# Patient Record
Sex: FEMALE | Race: WHITE | NOT HISPANIC OR LATINO | Employment: PART TIME | URBAN - METROPOLITAN AREA
[De-identification: names, ages, dates, MRNs, and addresses within clinical notes are randomized per-mention and may not be internally consistent; named-entity substitution may affect disease eponyms.]

---

## 2018-04-02 ENCOUNTER — OFFICE VISIT (OUTPATIENT)
Dept: URGENT CARE | Facility: CLINIC | Age: 33
End: 2018-04-02
Payer: COMMERCIAL

## 2018-04-02 VITALS
SYSTOLIC BLOOD PRESSURE: 148 MMHG | DIASTOLIC BLOOD PRESSURE: 74 MMHG | OXYGEN SATURATION: 98 % | HEART RATE: 64 BPM | TEMPERATURE: 97.9 F | RESPIRATION RATE: 18 BRPM

## 2018-04-02 DIAGNOSIS — B34.9 ACUTE VIRAL SYNDROME: Primary | ICD-10-CM

## 2018-04-02 DIAGNOSIS — J02.9 ACUTE PHARYNGITIS, UNSPECIFIED ETIOLOGY: ICD-10-CM

## 2018-04-02 LAB — S PYO AG THROAT QL: NEGATIVE

## 2018-04-02 PROCEDURE — 87430 STREP A AG IA: CPT | Performed by: NURSE PRACTITIONER

## 2018-04-02 PROCEDURE — S9088 SERVICES PROVIDED IN URGENT: HCPCS | Performed by: NURSE PRACTITIONER

## 2018-04-02 PROCEDURE — 99203 OFFICE O/P NEW LOW 30 MIN: CPT | Performed by: NURSE PRACTITIONER

## 2018-04-02 RX ORDER — PREDNISONE 10 MG/1
10 TABLET ORAL 2 TIMES DAILY WITH MEALS
Qty: 10 TABLET | Refills: 0 | Status: SHIPPED | OUTPATIENT
Start: 2018-04-02 | End: 2018-04-07

## 2018-04-02 NOTE — PATIENT INSTRUCTIONS
Discussed viral vs bacterial infection  RX medication as directed  Continue OTC cough/cold medications as directed  Call or return for problems/concerns  Work note given

## 2018-04-02 NOTE — PROGRESS NOTES
[email or web address] [email or web address]        NAME: Carlos Worley is a 27 y o  female  : 1985    MRN: [id number]  DATE: 2018  TIME: 9:09 AM    Assessment and Plan   Acute viral syndrome [B34 9]  1  Acute viral syndrome  predniSONE 10 mg tablet   2  Acute pharyngitis, unspecified etiology  POCT rapid strepA    predniSONE 10 mg tablet         Patient Instructions     Patient Instructions   Discussed viral vs bacterial infection  RX medication as directed  Continue OTC cough/cold medications as directed  Call or return for problems/concerns  Work note given      Follow up with PCP in 3-5 days  Proceed to  ER if symptoms worsen  Chief Complaint     Chief Complaint   Patient presents with    Sore Throat     Pt c/o a sore throat, fever and right ear pain since this morning  History of Present Illness       Was feeling fatigued yesterday, woke up with sore throat and right ear pain this morning  Fever was 101 0 this am, took Advil and is feeling slightly improved      Sore Throat    Associated symptoms include coughing and headaches  Pertinent negatives include no diarrhea or vomiting  Review of Systems   Review of Systems   Constitutional: Positive for activity change, fatigue and fever  HENT: Positive for rhinorrhea and sore throat  Eyes: Negative for pain, discharge and redness  Respiratory: Positive for cough  Negative for wheezing  Cardiovascular: Negative for chest pain  Gastrointestinal: Negative for constipation, diarrhea, nausea and vomiting  Musculoskeletal: Positive for myalgias  Skin: Negative for rash  Neurological: Positive for headaches  Negative for dizziness           Current Medications       Current Outpatient Prescriptions:     predniSONE 10 mg tablet, Take 1 tablet (10 mg total) by mouth 2 (two) times a day with meals for 5 days, Disp: 10 tablet, Rfl: 0    Current Allergies     Allergies as of 2018    (No Known Allergies)            The following portions of the patient's history were reviewed and updated as appropriate: allergies, current medications, past family history, past medical history, past social history, past surgical history and problem list      No past medical history on file  No past surgical history on file  No family history on file  Medications have been verified  Objective   /74   Pulse 64   Temp 97 9 °F (36 6 °C)   Resp 18   SpO2 98%        Physical Exam     Physical Exam   Constitutional: She is oriented to person, place, and time  She appears well-developed and well-nourished  She has a sickly appearance  No distress  HENT:   Head: Normocephalic and atraumatic  Right Ear: Tympanic membrane, external ear and ear canal normal    Left Ear: Tympanic membrane, external ear and ear canal normal    Nose: No epistaxis  Mouth/Throat: Uvula is midline, oropharynx is clear and moist and mucous membranes are normal    Eyes: Conjunctivae and EOM are normal  Right eye exhibits no discharge  Left eye exhibits no discharge  Cardiovascular: Regular rhythm and normal heart sounds  Tachycardia present  Exam reveals no gallop and no friction rub  No murmur heard  Pulmonary/Chest: Effort normal and breath sounds normal  No respiratory distress  She has no wheezes  She has no rales  Abdominal: She exhibits no distension  There is no tenderness  Lymphadenopathy:     She has no cervical adenopathy  Neurological: She is alert and oriented to person, place, and time  Skin: Skin is warm  She is not diaphoretic  Psychiatric: She has a normal mood and affect  Her behavior is normal  Judgment and thought content normal    Nursing note and vitals reviewed

## 2021-11-18 ENCOUNTER — OFFICE VISIT (OUTPATIENT)
Dept: FAMILY MEDICINE CLINIC | Facility: CLINIC | Age: 36
End: 2021-11-18
Payer: COMMERCIAL

## 2021-11-18 VITALS
SYSTOLIC BLOOD PRESSURE: 112 MMHG | HEART RATE: 76 BPM | WEIGHT: 152 LBS | DIASTOLIC BLOOD PRESSURE: 80 MMHG | BODY MASS INDEX: 25.95 KG/M2 | OXYGEN SATURATION: 98 % | TEMPERATURE: 99.8 F | HEIGHT: 64 IN

## 2021-11-18 DIAGNOSIS — Z13.220 LIPID SCREENING: ICD-10-CM

## 2021-11-18 DIAGNOSIS — Z71.3 ENCOUNTER FOR WEIGHT LOSS COUNSELING: ICD-10-CM

## 2021-11-18 DIAGNOSIS — Z13.31 NEGATIVE DEPRESSION SCREENING: ICD-10-CM

## 2021-11-18 DIAGNOSIS — Z13.0 SCREENING FOR DEFICIENCY ANEMIA: ICD-10-CM

## 2021-11-18 DIAGNOSIS — Z12.4 SCREENING FOR CERVICAL CANCER: ICD-10-CM

## 2021-11-18 DIAGNOSIS — E55.9 VITAMIN D INSUFFICIENCY: ICD-10-CM

## 2021-11-18 DIAGNOSIS — R63.5 WEIGHT GAIN FINDING: ICD-10-CM

## 2021-11-18 DIAGNOSIS — Z13.29 THYROID DISORDER SCREEN: ICD-10-CM

## 2021-11-18 DIAGNOSIS — Z76.89 ENCOUNTER TO ESTABLISH CARE: Primary | ICD-10-CM

## 2021-11-18 DIAGNOSIS — Z13.1 DIABETES MELLITUS SCREENING: ICD-10-CM

## 2021-11-18 PROCEDURE — 3725F SCREEN DEPRESSION PERFORMED: CPT | Performed by: PHYSICIAN ASSISTANT

## 2021-11-18 PROCEDURE — 1036F TOBACCO NON-USER: CPT | Performed by: PHYSICIAN ASSISTANT

## 2021-11-18 PROCEDURE — 3008F BODY MASS INDEX DOCD: CPT | Performed by: PHYSICIAN ASSISTANT

## 2021-11-18 PROCEDURE — 99203 OFFICE O/P NEW LOW 30 MIN: CPT | Performed by: PHYSICIAN ASSISTANT

## 2021-11-18 RX ORDER — VALACYCLOVIR HYDROCHLORIDE 1 G/1
TABLET, FILM COATED ORAL AS NEEDED
COMMUNITY
Start: 2021-10-05 | End: 2021-12-17 | Stop reason: SDUPTHER

## 2021-11-18 RX ORDER — PHENTERMINE HYDROCHLORIDE 37.5 MG/1
TABLET ORAL
Qty: 30 TABLET | Refills: 0 | Status: SHIPPED | OUTPATIENT
Start: 2021-11-18 | End: 2021-12-17 | Stop reason: SDUPTHER

## 2021-11-19 ENCOUNTER — APPOINTMENT (OUTPATIENT)
Dept: LAB | Facility: CLINIC | Age: 36
End: 2021-11-19
Payer: COMMERCIAL

## 2021-11-19 DIAGNOSIS — Z13.0 SCREENING FOR DEFICIENCY ANEMIA: ICD-10-CM

## 2021-11-19 DIAGNOSIS — E55.9 VITAMIN D INSUFFICIENCY: ICD-10-CM

## 2021-11-19 DIAGNOSIS — Z13.220 LIPID SCREENING: ICD-10-CM

## 2021-11-19 DIAGNOSIS — Z13.1 DIABETES MELLITUS SCREENING: ICD-10-CM

## 2021-11-19 DIAGNOSIS — Z13.29 THYROID DISORDER SCREEN: ICD-10-CM

## 2021-11-19 DIAGNOSIS — R63.5 WEIGHT GAIN FINDING: ICD-10-CM

## 2021-11-19 LAB
25(OH)D3 SERPL-MCNC: 19 NG/ML (ref 30–100)
ALBUMIN SERPL BCP-MCNC: 4 G/DL (ref 3.5–5)
ALP SERPL-CCNC: 65 U/L (ref 46–116)
ALT SERPL W P-5'-P-CCNC: 25 U/L (ref 12–78)
ANION GAP SERPL CALCULATED.3IONS-SCNC: 7 MMOL/L (ref 4–13)
AST SERPL W P-5'-P-CCNC: 13 U/L (ref 5–45)
BASOPHILS # BLD AUTO: 0.03 THOUSANDS/ΜL (ref 0–0.1)
BASOPHILS NFR BLD AUTO: 1 % (ref 0–1)
BILIRUB SERPL-MCNC: 0.76 MG/DL (ref 0.2–1)
BUN SERPL-MCNC: 10 MG/DL (ref 5–25)
CALCIUM SERPL-MCNC: 9.2 MG/DL (ref 8.3–10.1)
CHLORIDE SERPL-SCNC: 104 MMOL/L (ref 100–108)
CHOLEST SERPL-MCNC: 138 MG/DL
CO2 SERPL-SCNC: 26 MMOL/L (ref 21–32)
CREAT SERPL-MCNC: 0.59 MG/DL (ref 0.6–1.3)
EOSINOPHIL # BLD AUTO: 0.2 THOUSAND/ΜL (ref 0–0.61)
EOSINOPHIL NFR BLD AUTO: 3 % (ref 0–6)
ERYTHROCYTE [DISTWIDTH] IN BLOOD BY AUTOMATED COUNT: 11.9 % (ref 11.6–15.1)
GFR SERPL CREATININE-BSD FRML MDRD: 117 ML/MIN/1.73SQ M
GLUCOSE P FAST SERPL-MCNC: 87 MG/DL (ref 65–99)
HCT VFR BLD AUTO: 39.5 % (ref 34.8–46.1)
HDLC SERPL-MCNC: 51 MG/DL
HGB BLD-MCNC: 13.4 G/DL (ref 11.5–15.4)
IMM GRANULOCYTES # BLD AUTO: 0.03 THOUSAND/UL (ref 0–0.2)
IMM GRANULOCYTES NFR BLD AUTO: 1 % (ref 0–2)
LDLC SERPL CALC-MCNC: 79 MG/DL (ref 0–100)
LYMPHOCYTES # BLD AUTO: 2.26 THOUSANDS/ΜL (ref 0.6–4.47)
LYMPHOCYTES NFR BLD AUTO: 39 % (ref 14–44)
MCH RBC QN AUTO: 29.6 PG (ref 26.8–34.3)
MCHC RBC AUTO-ENTMCNC: 33.9 G/DL (ref 31.4–37.4)
MCV RBC AUTO: 87 FL (ref 82–98)
MONOCYTES # BLD AUTO: 0.52 THOUSAND/ΜL (ref 0.17–1.22)
MONOCYTES NFR BLD AUTO: 9 % (ref 4–12)
NEUTROPHILS # BLD AUTO: 2.79 THOUSANDS/ΜL (ref 1.85–7.62)
NEUTS SEG NFR BLD AUTO: 47 % (ref 43–75)
NONHDLC SERPL-MCNC: 87 MG/DL
NRBC BLD AUTO-RTO: 0 /100 WBCS
PLATELET # BLD AUTO: 225 THOUSANDS/UL (ref 149–390)
PMV BLD AUTO: 9.8 FL (ref 8.9–12.7)
POTASSIUM SERPL-SCNC: 4.1 MMOL/L (ref 3.5–5.3)
PROT SERPL-MCNC: 7.7 G/DL (ref 6.4–8.2)
RBC # BLD AUTO: 4.53 MILLION/UL (ref 3.81–5.12)
SODIUM SERPL-SCNC: 137 MMOL/L (ref 136–145)
TRIGL SERPL-MCNC: 40 MG/DL
TSH SERPL DL<=0.05 MIU/L-ACNC: 3.34 UIU/ML (ref 0.36–3.74)
WBC # BLD AUTO: 5.83 THOUSAND/UL (ref 4.31–10.16)

## 2021-11-19 PROCEDURE — 85025 COMPLETE CBC W/AUTO DIFF WBC: CPT

## 2021-11-19 PROCEDURE — 80053 COMPREHEN METABOLIC PANEL: CPT

## 2021-11-19 PROCEDURE — 80061 LIPID PANEL: CPT

## 2021-11-19 PROCEDURE — 36415 COLL VENOUS BLD VENIPUNCTURE: CPT

## 2021-11-19 PROCEDURE — 84443 ASSAY THYROID STIM HORMONE: CPT

## 2021-11-19 PROCEDURE — 82306 VITAMIN D 25 HYDROXY: CPT

## 2021-11-22 DIAGNOSIS — E55.9 VITAMIN D DEFICIENCY: Primary | ICD-10-CM

## 2021-11-22 RX ORDER — ERGOCALCIFEROL 1.25 MG/1
50000 CAPSULE ORAL WEEKLY
Qty: 12 CAPSULE | Refills: 0 | Status: SHIPPED | OUTPATIENT
Start: 2021-11-22 | End: 2022-03-18

## 2021-11-26 ENCOUNTER — TELEPHONE (OUTPATIENT)
Dept: FAMILY MEDICINE CLINIC | Facility: CLINIC | Age: 36
End: 2021-11-26

## 2021-12-17 ENCOUNTER — OFFICE VISIT (OUTPATIENT)
Dept: FAMILY MEDICINE CLINIC | Facility: CLINIC | Age: 36
End: 2021-12-17
Payer: COMMERCIAL

## 2021-12-17 VITALS
SYSTOLIC BLOOD PRESSURE: 122 MMHG | DIASTOLIC BLOOD PRESSURE: 80 MMHG | OXYGEN SATURATION: 99 % | BODY MASS INDEX: 24.99 KG/M2 | WEIGHT: 150 LBS | HEIGHT: 65 IN | HEART RATE: 86 BPM | TEMPERATURE: 98.1 F

## 2021-12-17 DIAGNOSIS — R41.840 ATTENTION DEFICIT: ICD-10-CM

## 2021-12-17 DIAGNOSIS — Z71.3 ENCOUNTER FOR WEIGHT LOSS COUNSELING: Primary | ICD-10-CM

## 2021-12-17 DIAGNOSIS — Z12.4 SCREENING FOR CERVICAL CANCER: ICD-10-CM

## 2021-12-17 DIAGNOSIS — Z86.19 H/O COLD SORES: ICD-10-CM

## 2021-12-17 DIAGNOSIS — E55.9 VITAMIN D DEFICIENCY: ICD-10-CM

## 2021-12-17 PROCEDURE — 99214 OFFICE O/P EST MOD 30 MIN: CPT | Performed by: PHYSICIAN ASSISTANT

## 2021-12-17 PROCEDURE — 1036F TOBACCO NON-USER: CPT | Performed by: PHYSICIAN ASSISTANT

## 2021-12-17 PROCEDURE — 3008F BODY MASS INDEX DOCD: CPT | Performed by: PHYSICIAN ASSISTANT

## 2021-12-17 RX ORDER — VALACYCLOVIR HYDROCHLORIDE 1 G/1
2000 TABLET, FILM COATED ORAL 2 TIMES DAILY
Qty: 12 TABLET | Refills: 1 | Status: SHIPPED | OUTPATIENT
Start: 2021-12-17 | End: 2022-03-18

## 2021-12-17 RX ORDER — PHENTERMINE HYDROCHLORIDE 37.5 MG/1
37.5 TABLET ORAL DAILY
Qty: 30 TABLET | Refills: 0 | Status: SHIPPED | OUTPATIENT
Start: 2021-12-17 | End: 2022-01-27 | Stop reason: SDUPTHER

## 2021-12-23 ENCOUNTER — TELEPHONE (OUTPATIENT)
Dept: FAMILY MEDICINE CLINIC | Facility: CLINIC | Age: 36
End: 2021-12-23

## 2021-12-27 DIAGNOSIS — Z13.1 DIABETES MELLITUS SCREENING: ICD-10-CM

## 2021-12-27 DIAGNOSIS — Z13.0 SCREENING FOR DEFICIENCY ANEMIA: ICD-10-CM

## 2021-12-27 DIAGNOSIS — Z13.21 ENCOUNTER FOR VITAMIN DEFICIENCY SCREENING: ICD-10-CM

## 2021-12-27 DIAGNOSIS — Z13.29 THYROID DISORDER SCREEN: Primary | ICD-10-CM

## 2022-01-27 DIAGNOSIS — Z71.3 ENCOUNTER FOR WEIGHT LOSS COUNSELING: ICD-10-CM

## 2022-01-27 RX ORDER — PHENTERMINE HYDROCHLORIDE 37.5 MG/1
37.5 TABLET ORAL DAILY
Qty: 30 TABLET | Refills: 0 | Status: SHIPPED | OUTPATIENT
Start: 2022-01-27 | End: 2022-03-18

## 2022-01-27 NOTE — TELEPHONE ENCOUNTER
Please let patient know that after this month, will taper to half dose and then stop (as explained at vist short term medication)

## 2022-03-18 ENCOUNTER — OFFICE VISIT (OUTPATIENT)
Dept: FAMILY MEDICINE CLINIC | Facility: CLINIC | Age: 37
End: 2022-03-18
Payer: COMMERCIAL

## 2022-03-18 VITALS
OXYGEN SATURATION: 99 % | DIASTOLIC BLOOD PRESSURE: 82 MMHG | HEIGHT: 65 IN | BODY MASS INDEX: 23.82 KG/M2 | TEMPERATURE: 98.4 F | HEART RATE: 78 BPM | WEIGHT: 143 LBS | SYSTOLIC BLOOD PRESSURE: 136 MMHG

## 2022-03-18 DIAGNOSIS — F90.0 ATTENTION DEFICIT HYPERACTIVITY DISORDER (ADHD), PREDOMINANTLY INATTENTIVE TYPE: Primary | ICD-10-CM

## 2022-03-18 PROCEDURE — 1036F TOBACCO NON-USER: CPT | Performed by: PHYSICIAN ASSISTANT

## 2022-03-18 PROCEDURE — 99213 OFFICE O/P EST LOW 20 MIN: CPT | Performed by: PHYSICIAN ASSISTANT

## 2022-03-18 PROCEDURE — 3008F BODY MASS INDEX DOCD: CPT | Performed by: PHYSICIAN ASSISTANT

## 2022-03-18 RX ORDER — DEXTROAMPHETAMINE SACCHARATE, AMPHETAMINE ASPARTATE, DEXTROAMPHETAMINE SULFATE AND AMPHETAMINE SULFATE 2.5; 2.5; 2.5; 2.5 MG/1; MG/1; MG/1; MG/1
5 TABLET ORAL
Qty: 60 TABLET | Refills: 0 | Status: SHIPPED | OUTPATIENT
Start: 2022-03-18 | End: 2022-05-19 | Stop reason: SDUPTHER

## 2022-03-18 NOTE — PROGRESS NOTES
Routine Follow-up    Angelo Cotton 43 y o  female   Date:  3/18/2022      Assessment and Plan:    Cathy Fuller was seen today for follow-up  Diagnoses and all orders for this visit:    Attention deficit hyperactivity disorder (ADHD), predominantly inattentive type  -     amphetamine-dextroamphetamine (ADDERALL, 10MG,) 10 mg tablet; Take 0 5 tablets (5 mg total) by mouth 2 (two) times a day Max Daily Amount: 10 mg  - patient was diagnosed with ADHD during elementary school, was treated with ritalin  - she is having difficulty with concentration and ability to complete tasks in job and at home   - she understands this is a controlled substance, at risk of abuse, misuse, dependence  - follow up in 3 months   - can adjust dose prn     BMI 23 0-23 9, adult  Comments:  patient had successful weight loss following 3 month course of phentermine, she understands this is a short term course; continue healthy lifestyle           HPI:  Chief Complaint   Patient presents with    Follow-up     3 month check  No concerns today Declined flu shot  No refills needed today  HPI   Patient is a 42 yo female who presents for 3 month follow up  She has been able to lose 10 lbs  Her BMI is healthy  She has been on adipex x 3 months  She had cut the 37 5 mg tablet in half to take daily, only has small amount left  She was diagnosed with ADHD in elementary school  She was on ritalin during school  She noticed such an improvement in her ability to focus and complete tasks while being on the phentermine  She was having difficulty concentrating at work or completing tasks at home  She did have tdap within past 10 years, unsure of date  She will schedule appt with Dr Dahiana Dial for pap smear  ROS: Review of Systems   Constitutional: Negative  Unexpected weight change: intentional wt loss  Respiratory: Negative  Cardiovascular: Negative  Neurological: Negative  Psychiatric/Behavioral: Positive for decreased concentration  Negative for dysphoric mood  The patient is not nervous/anxious  History reviewed  No pertinent past medical history    Patient Active Problem List   Diagnosis    Vitamin D deficiency       Past Surgical History:   Procedure Laterality Date    AUGMENTATION BREAST Bilateral 2012     SECTION      MOUTH SURGERY         Social History     Socioeconomic History    Marital status: /Civil Union     Spouse name: None    Number of children: None    Years of education: None    Highest education level: None   Occupational History    None   Tobacco Use    Smoking status: Former Smoker    Smokeless tobacco: Never Used   Substance and Sexual Activity    Alcohol use: Yes     Comment: social    Drug use: Never    Sexual activity: None   Other Topics Concern    None   Social History Narrative    None     Social Determinants of Health     Financial Resource Strain: Not on file   Food Insecurity: Not on file   Transportation Needs: Not on file   Physical Activity: Not on file   Stress: Not on file   Social Connections: Not on file   Intimate Partner Violence: Not on file   Housing Stability: Not on file       Family History   Problem Relation Age of Onset    Diabetes Mother     Hypertension Father     Hyperlipidemia Father        No Known Allergies      Current Outpatient Medications:     Multiple Vitamin (MULTIVITAMIN ADULT PO), Take 1 tablet by mouth daily, Disp: , Rfl:     valACYclovir (VALTREX) 1,000 mg tablet, Take 2 tablets (2,000 mg total) by mouth 2 (two) times a day for 1 day (Patient taking differently: Take 2,000 mg by mouth 2 (two) times a day as needed  ), Disp: 12 tablet, Rfl: 1    amphetamine-dextroamphetamine (ADDERALL, 10MG,) 10 mg tablet, Take 0 5 tablets (5 mg total) by mouth 2 (two) times a day Max Daily Amount: 10 mg, Disp: 60 tablet, Rfl: 0      Physical Exam:  /82 (BP Location: Left arm, Patient Position: Sitting, Cuff Size: Standard)   Pulse 78   Temp 98 4 °F (36 9 °C) (Tympanic)   Ht 5' 5" (1 651 m)   Wt 64 9 kg (143 lb)   SpO2 99%   BMI 23 80 kg/m²     Physical Exam  Constitutional:       General: She is not in acute distress  Appearance: Normal appearance  She is normal weight  Cardiovascular:      Rate and Rhythm: Normal rate and regular rhythm  Heart sounds: No murmur heard  Pulmonary:      Effort: Pulmonary effort is normal  No respiratory distress  Breath sounds: Normal breath sounds  No wheezing  Neurological:      General: No focal deficit present  Mental Status: She is alert and oriented to person, place, and time  Psychiatric:         Mood and Affect: Mood normal          Behavior: Behavior normal          Thought Content:  Thought content normal          Judgment: Judgment normal            Labs:  Lab Results   Component Value Date    WBC 5 83 11/19/2021    HGB 13 4 11/19/2021    HCT 39 5 11/19/2021    MCV 87 11/19/2021     11/19/2021     Lab Results   Component Value Date    K 4 1 11/19/2021     11/19/2021    CO2 26 11/19/2021    BUN 10 11/19/2021    CREATININE 0 59 (L) 11/19/2021    GLUF 87 11/19/2021    CALCIUM 9 2 11/19/2021    AST 13 11/19/2021    ALT 25 11/19/2021    ALKPHOS 65 11/19/2021    EGFR 117 11/19/2021

## 2022-05-19 DIAGNOSIS — F90.0 ATTENTION DEFICIT HYPERACTIVITY DISORDER (ADHD), PREDOMINANTLY INATTENTIVE TYPE: ICD-10-CM

## 2022-05-19 RX ORDER — DEXTROAMPHETAMINE SACCHARATE, AMPHETAMINE ASPARTATE, DEXTROAMPHETAMINE SULFATE AND AMPHETAMINE SULFATE 2.5; 2.5; 2.5; 2.5 MG/1; MG/1; MG/1; MG/1
10 TABLET ORAL
Qty: 60 TABLET | Refills: 0 | Status: SHIPPED | OUTPATIENT
Start: 2022-05-19 | End: 2022-07-08 | Stop reason: SDUPTHER

## 2022-07-01 ENCOUNTER — RA CDI HCC (OUTPATIENT)
Dept: OTHER | Facility: HOSPITAL | Age: 37
End: 2022-07-01

## 2022-07-01 NOTE — PROGRESS NOTES
[facility]  coding opportunities       Chart reviewed, no opportunity found: CHART REVIEWED, NO OPPORTUNITY FOUND        Patients Insurance        Commercial Insurance: [employer]

## 2022-07-08 ENCOUNTER — OFFICE VISIT (OUTPATIENT)
Dept: FAMILY MEDICINE CLINIC | Facility: CLINIC | Age: 37
End: 2022-07-08
Payer: COMMERCIAL

## 2022-07-08 VITALS
OXYGEN SATURATION: 99 % | HEIGHT: 65 IN | SYSTOLIC BLOOD PRESSURE: 142 MMHG | WEIGHT: 145 LBS | DIASTOLIC BLOOD PRESSURE: 88 MMHG | HEART RATE: 80 BPM | BODY MASS INDEX: 24.16 KG/M2 | TEMPERATURE: 97.3 F

## 2022-07-08 DIAGNOSIS — F90.0 ATTENTION DEFICIT HYPERACTIVITY DISORDER (ADHD), PREDOMINANTLY INATTENTIVE TYPE: Primary | ICD-10-CM

## 2022-07-08 DIAGNOSIS — Z12.4 SCREENING FOR CERVICAL CANCER: ICD-10-CM

## 2022-07-08 DIAGNOSIS — Z79.899 CONTROLLED SUBSTANCE AGREEMENT SIGNED: ICD-10-CM

## 2022-07-08 DIAGNOSIS — R03.0 TRANSIENT ELEVATED BLOOD PRESSURE: ICD-10-CM

## 2022-07-08 PROCEDURE — 99213 OFFICE O/P EST LOW 20 MIN: CPT | Performed by: PHYSICIAN ASSISTANT

## 2022-07-08 RX ORDER — DEXTROAMPHETAMINE SACCHARATE, AMPHETAMINE ASPARTATE, DEXTROAMPHETAMINE SULFATE AND AMPHETAMINE SULFATE 2.5; 2.5; 2.5; 2.5 MG/1; MG/1; MG/1; MG/1
15 TABLET ORAL
Qty: 90 TABLET | Refills: 0 | Status: SHIPPED | OUTPATIENT
Start: 2022-07-08

## 2022-07-08 RX ORDER — MELATONIN
1000 DAILY
COMMUNITY

## 2022-07-08 NOTE — PROGRESS NOTES
Routine Follow-up    Rubio Aceves 42 y o  female   Date:  7/8/2022      Assessment and Plan:    Kay Lancaster was seen today for follow-up  Diagnoses and all orders for this visit:    Attention deficit hyperactivity disorder (ADHD), predominantly inattentive type  Comments:  will trial increase dose of 15 mg bid, update within next few weeks; monitor BP at home; follow up every 3-6 months   Orders:  -     amphetamine-dextroamphetamine (ADDERALL, 10MG,) 10 mg tablet; Take 1 5 tablets (15 mg total) by mouth 2 (two) times a day Max Daily Amount: 30 mg    Screening for cervical cancer  Comments:  OBGYN is Dr Castro Jha, does not need referral     Transient elevated blood pressure  Comments:  likely related to situational stress, see hpi; reduce and monitor caffeine; monitor BP at home especially while taking adderall; previous visits with normal BP     Controlled substance agreement signed  Comments:  she understands stimulant is a controlled substance, agreement reviewed and signed together; PDMP reviewed without red flags            HPI:  Chief Complaint   Patient presents with    Follow-up     3 month follow up     HPI   Patient is a 43 yo female who presents for routine 3 month follow up for management of ADHD  She has been tolerating adderal 10 mg twice daily without concerns  She admits that it works better than 5 mg but still feels she could benefit from dose adjustment  She does not take medication everyday, only on work day or if needs to be very productive at home  She is willing to try 1 5 tablet first rather than doubling the dose  She will update me with benefit/concerns within next few weeks to determine continued dose  She does notice that if she drinks too much coffee in mornings she will get a headaches  She has a BP cuff at home to start checking her BP  She has transient elevated BP today which she attributes to stress as her daughter is learning how to drive and she drove her here   This makes her quite anxious  She has not taken adderal in a few days so not related to medication  She has noticed how not taking the medication poorly affects her at work  She explains that she felt focus while on Wellbutrin about 5 years ago when trying to quit smoking but it gave her abnormal taste in mouth, would not consider this again  She has no other concerns  ROS: Review of Systems   Respiratory: Negative  Cardiovascular: Negative  Neurological: Positive for headaches  Psychiatric/Behavioral: Positive for decreased concentration (improved with adderall)  Negative for dysphoric mood  The patient is not nervous/anxious  History reviewed  No pertinent past medical history    Patient Active Problem List   Diagnosis    Vitamin D deficiency       Past Surgical History:   Procedure Laterality Date    AUGMENTATION BREAST Bilateral 2012     SECTION      MOUTH SURGERY         Social History     Socioeconomic History    Marital status: /Civil Union     Spouse name: None    Number of children: None    Years of education: None    Highest education level: None   Occupational History    None   Tobacco Use    Smoking status: Former Smoker    Smokeless tobacco: Never Used   Substance and Sexual Activity    Alcohol use: Yes     Comment: social    Drug use: Never    Sexual activity: None   Other Topics Concern    None   Social History Narrative    None     Social Determinants of Health     Financial Resource Strain: Not on file   Food Insecurity: Not on file   Transportation Needs: Not on file   Physical Activity: Not on file   Stress: Not on file   Social Connections: Not on file   Intimate Partner Violence: Not on file   Housing Stability: Not on file       Family History   Problem Relation Age of Onset    Diabetes Mother     Hypertension Father     Hyperlipidemia Father        No Known Allergies      Current Outpatient Medications:     amphetamine-dextroamphetamine (ADDERALL, 10MG,) 10 mg tablet, Take 1 5 tablets (15 mg total) by mouth 2 (two) times a day Max Daily Amount: 30 mg, Disp: 90 tablet, Rfl: 0    cholecalciferol (VITAMIN D3) 1,000 units tablet, Take 1,000 Units by mouth daily, Disp: , Rfl:     Multiple Vitamin (MULTIVITAMIN ADULT PO), Take 1 tablet by mouth daily, Disp: , Rfl:     valACYclovir (VALTREX) 1,000 mg tablet, Take 2 tablets (2,000 mg total) by mouth 2 (two) times a day for 1 day (Patient taking differently: Take 2,000 mg by mouth 2 (two) times a day as needed  ), Disp: 12 tablet, Rfl: 1      Physical Exam:  /88 (BP Location: Right arm, Patient Position: Sitting, Cuff Size: Standard) Comment: recheck at end of visit  Pulse 80   Temp (!) 97 3 °F (36 3 °C) (Tympanic)   Ht 5' 5" (1 651 m)   Wt 65 8 kg (145 lb)   SpO2 99%   BMI 24 13 kg/m²     Physical Exam  Constitutional:       General: She is not in acute distress  HENT:      Head: Normocephalic and atraumatic  Cardiovascular:      Rate and Rhythm: Normal rate  Pulmonary:      Effort: No respiratory distress  Neurological:      General: No focal deficit present  Mental Status: She is alert and oriented to person, place, and time  Psychiatric:         Mood and Affect: Mood normal          Behavior: Behavior normal          Thought Content:  Thought content normal          Judgment: Judgment normal            Labs:  Lab Results   Component Value Date    WBC 5 83 11/19/2021    HGB 13 4 11/19/2021    HCT 39 5 11/19/2021    MCV 87 11/19/2021     11/19/2021     Lab Results   Component Value Date    K 4 1 11/19/2021     11/19/2021    CO2 26 11/19/2021    BUN 10 11/19/2021    CREATININE 0 59 (L) 11/19/2021    GLUF 87 11/19/2021    CALCIUM 9 2 11/19/2021    AST 13 11/19/2021    ALT 25 11/19/2021    ALKPHOS 65 11/19/2021    EGFR 117 11/19/2021

## 2023-01-27 ENCOUNTER — OFFICE VISIT (OUTPATIENT)
Dept: FAMILY MEDICINE CLINIC | Facility: CLINIC | Age: 38
End: 2023-01-27

## 2023-01-27 VITALS
BODY MASS INDEX: 25.86 KG/M2 | HEIGHT: 65 IN | OXYGEN SATURATION: 100 % | TEMPERATURE: 98.3 F | HEART RATE: 71 BPM | SYSTOLIC BLOOD PRESSURE: 125 MMHG | WEIGHT: 155.2 LBS | DIASTOLIC BLOOD PRESSURE: 80 MMHG

## 2023-01-27 DIAGNOSIS — Z00.00 ANNUAL PHYSICAL EXAM: Primary | ICD-10-CM

## 2023-01-27 DIAGNOSIS — Z82.49 FAMILY HISTORY OF HYPERTENSION IN FATHER: ICD-10-CM

## 2023-01-27 DIAGNOSIS — Z13.220 LIPID SCREENING: ICD-10-CM

## 2023-01-27 DIAGNOSIS — Z13.21 ENCOUNTER FOR VITAMIN DEFICIENCY SCREENING: ICD-10-CM

## 2023-01-27 DIAGNOSIS — F90.0 ADHD (ATTENTION DEFICIT HYPERACTIVITY DISORDER), INATTENTIVE TYPE: ICD-10-CM

## 2023-01-27 DIAGNOSIS — Z86.19 H/O COLD SORES: ICD-10-CM

## 2023-01-27 DIAGNOSIS — Z13.29 THYROID DISORDER SCREEN: ICD-10-CM

## 2023-01-27 DIAGNOSIS — F15.90 AMPHETAMINE USE: ICD-10-CM

## 2023-01-27 DIAGNOSIS — R03.0 ELEVATED BLOOD PRESSURE READING WITHOUT DIAGNOSIS OF HYPERTENSION: ICD-10-CM

## 2023-01-27 DIAGNOSIS — E55.9 VITAMIN D DEFICIENCY: ICD-10-CM

## 2023-01-27 DIAGNOSIS — Z13.1 DIABETES MELLITUS SCREENING: ICD-10-CM

## 2023-01-27 RX ORDER — VALACYCLOVIR HYDROCHLORIDE 1 G/1
2000 TABLET, FILM COATED ORAL 2 TIMES DAILY PRN
Qty: 15 TABLET | Refills: 1 | Status: SHIPPED | OUTPATIENT
Start: 2023-01-27 | End: 2023-01-28

## 2023-01-27 NOTE — PATIENT INSTRUCTIONS

## 2023-01-27 NOTE — PROGRESS NOTES
[address]     NAME: Amada Gannon  AGE: 42 y o  SEX: female  : 1985     DATE: 2023     Assessment and Plan:   Lowell Tristan was seen today for physical exam     Diagnoses and all orders for this visit:    Annual physical exam    Elevated blood pressure reading without diagnosis of hypertension  Comments:  135/85 when first arrived- came down to 125/80 on recheck- continue monitoring, advised cut way back on caffeine intake    Family history of hypertension in father    ADHD (attention deficit hyperactivity disorder), inattentive type  Comments:  cpm    Amphetamine use    BMI 25 0-25 9,adult    H/O cold sores  -     valACYclovir (VALTREX) 1,000 mg tablet; Take 2 tablets (2,000 mg total) by mouth 2 (two) times a day as needed (take 2 times a day as needed) for up to 1 day    Vitamin D deficiency  -     Vitamin D 25 hydroxy; Future    Thyroid disorder screen  -     TSH, 3rd generation with Free T4 reflex; Future    Lipid screening  -     Lipid panel; Future    Encounter for vitamin deficiency screening  -     Vitamin D 25 hydroxy; Future    Diabetes mellitus screening  -     Comprehensive metabolic panel;  Future        Problem List Items Addressed This Visit        Other    Vitamin D deficiency    Relevant Orders    Vitamin D 25 hydroxy    H/O cold sores    Relevant Medications    valACYclovir (VALTREX) 1,000 mg tablet    Family history of hypertension in father    Elevated blood pressure reading without diagnosis of hypertension    BMI 25 0-25 9,adult    Amphetamine use    ADHD (attention deficit hyperactivity disorder), inattentive type   Other Visit Diagnoses     Annual physical exam    -  Primary    Thyroid disorder screen        Relevant Orders    TSH, 3rd generation with Free T4 reflex    Lipid screening        Relevant Orders    Lipid panel    Encounter for vitamin deficiency screening        Relevant Orders    Vitamin D 25 hydroxy    Diabetes mellitus screening        Relevant Orders    Comprehensive metabolic panel          Immunizations and preventive care screenings were discussed with patient today  Appropriate education was printed on patient's after visit summary  Counseling:  · Exercise: the importance of regular exercise/physical activity was discussed  Recommend exercise 3-5 times per week for at least 30 minutes  BMI Counseling: Body mass index is 25 83 kg/m²  The BMI is above normal  Nutrition recommendations include 3-5 servings of fruits/vegetables daily  Exercise recommendations include exercising 3-5 times per week  Return for July controlled substance agreement renewal      Chief Complaint:     Chief Complaint   Patient presents with   • Physical Exam     Refused flu vaccine      History of Present Illness:     Adult Annual Physical   Patient here for a comprehensive physical exam  This is the first time I am seeing this pt- she follows with other provider here who is on leave  Chart review shows due for routine labs, had transient bp elev last visit July 142/88  On longterm rx adderall for ADHD  States her dad has high blood pressure- she does check at home occasionally and usually in range to low normal, "sometimes it's yellow range"  The patient reports :Tetanus UTD 07/12/2015    Diet and Physical Activity  · Diet/Nutrition: well balanced diet  · Exercise: moderate cardiovascular exercise  Depression Screening  PHQ-2/9 Depression Screening    Little interest or pleasure in doing things: 0 - not at all  Feeling down, depressed, or hopeless: 0 - not at all  PHQ-2 Score: 0  PHQ-2 Interpretation: Negative depression screen       General Health  · Hearing: normal - bilateral   · Vision: goes for regular eye exams and wears glasses  · Dental: regular dental visits         /GYN Health  · Sees Dr Suzette Abreu for GYN care     Review of Systems:     Review of Systems   Gastrointestinal:        Sometimes have a hard time going to the bathroom, so started taking "Smooth Move" - takes as needed   Drink enough water, but drink too much coffee- 2 big 32 oz cups daily   All other systems reviewed and are negative  Past Medical History:     History reviewed  No pertinent past medical history     Past Surgical History:     Past Surgical History:   Procedure Laterality Date   • AUGMENTATION BREAST Bilateral    •  SECTION     • MOUTH SURGERY        Social History:     Social History     Socioeconomic History   • Marital status: /Civil Union     Spouse name: None   • Number of children: None   • Years of education: None   • Highest education level: None   Occupational History   • None   Tobacco Use   • Smoking status: Former   • Smokeless tobacco: Never   Substance and Sexual Activity   • Alcohol use: Yes     Comment: social   • Drug use: Never   • Sexual activity: None   Other Topics Concern   • None   Social History Narrative   • None     Social Determinants of Health     Financial Resource Strain: Not on file   Food Insecurity: Not on file   Transportation Needs: Not on file   Physical Activity: Not on file   Stress: Not on file   Social Connections: Not on file   Intimate Partner Violence: Not on file   Housing Stability: Not on file      Family History:     Family History   Problem Relation Age of Onset   • Diabetes Mother    • Hypertension Father    • Hyperlipidemia Father       Current Medications:     Current Outpatient Medications   Medication Sig Dispense Refill   • amphetamine-dextroamphetamine (ADDERALL, 10MG,) 10 mg tablet Take 1 5 tablets (15 mg total) by mouth 2 (two) times a day Max Daily Amount: 30 mg (Patient taking differently: Take 15 mg by mouth 2 (two) times a day As needed) 90 tablet 0   • cholecalciferol (VITAMIN D3) 1,000 units tablet Take 1,000 Units by mouth daily     • Multiple Vitamin (MULTIVITAMIN ADULT PO) Take 1 tablet by mouth daily     • valACYclovir (VALTREX) 1,000 mg tablet Take 2 tablets (2,000 mg total) by mouth 2 (two) times a day as needed (take 2 times a day as needed) for up to 1 day 15 tablet 1     No current facility-administered medications for this visit  Allergies:     No Known Allergies   Physical Exam:     /80 (BP Location: Left arm, Patient Position: Sitting, Cuff Size: Standard)   Pulse 71   Temp 98 3 °F (36 8 °C) (Tympanic)   Ht 5' 5" (1 651 m)   Wt 70 4 kg (155 lb 3 2 oz)   SpO2 100%   BMI 25 83 kg/m²     Physical Exam  Vitals and nursing note reviewed  Constitutional:       General: She is not in acute distress  Appearance: She is well-developed  She is not ill-appearing, toxic-appearing or diaphoretic  HENT:      Head: Normocephalic and atraumatic  Right Ear: Tympanic membrane, ear canal and external ear normal       Left Ear: Tympanic membrane, ear canal and external ear normal       Nose: Nose normal       Mouth/Throat:      Lips: Pink  Mouth: Mucous membranes are moist       Pharynx: Oropharynx is clear  Uvula midline  Tonsils: 0 on the right  0 on the left  Eyes:      General: Lids are normal       Extraocular Movements: Extraocular movements intact  Conjunctiva/sclera: Conjunctivae normal       Pupils: Pupils are equal, round, and reactive to light  Neck:      Thyroid: No thyroid mass, thyromegaly or thyroid tenderness  Vascular: No JVD  Trachea: Trachea and phonation normal    Cardiovascular:      Rate and Rhythm: Normal rate and regular rhythm  Pulses: Normal pulses  Heart sounds: Normal heart sounds  Pulmonary:      Effort: Pulmonary effort is normal       Breath sounds: Normal breath sounds and air entry  Abdominal:      General: Bowel sounds are decreased  There is no distension or abdominal bruit  Palpations: Abdomen is soft  There is no hepatomegaly, splenomegaly or mass  Tenderness: There is no abdominal tenderness        Hernia: There is no hernia in the ventral area    Musculoskeletal:      Cervical back: Neck supple  Right lower leg: No edema  Left lower leg: No edema  Lymphadenopathy:      Cervical: No cervical adenopathy  Skin:     General: Skin is warm and dry  Capillary Refill: Capillary refill takes less than 2 seconds  Coloration: Skin is not pale  Neurological:      Mental Status: She is alert and oriented to person, place, and time  Cranial Nerves: Cranial nerves 2-12 are intact  Sensory: Sensation is intact  Motor: Motor function is intact  Coordination: Coordination is intact  Gait: Gait and tandem walk normal       Deep Tendon Reflexes: Reflexes are normal and symmetric  Psychiatric:         Mood and Affect: Mood normal          Behavior: Behavior normal  Behavior is cooperative            Carmen Todd 238

## 2023-02-10 ENCOUNTER — APPOINTMENT (OUTPATIENT)
Dept: LAB | Facility: CLINIC | Age: 38
End: 2023-02-10

## 2023-02-10 DIAGNOSIS — Z13.21 ENCOUNTER FOR VITAMIN DEFICIENCY SCREENING: ICD-10-CM

## 2023-02-10 DIAGNOSIS — E55.9 VITAMIN D DEFICIENCY: ICD-10-CM

## 2023-02-10 DIAGNOSIS — Z13.29 THYROID DISORDER SCREEN: ICD-10-CM

## 2023-02-10 DIAGNOSIS — Z13.1 DIABETES MELLITUS SCREENING: ICD-10-CM

## 2023-02-10 DIAGNOSIS — Z13.220 LIPID SCREENING: ICD-10-CM

## 2023-02-10 LAB
25(OH)D3 SERPL-MCNC: 19.6 NG/ML (ref 30–100)
ALBUMIN SERPL BCP-MCNC: 4 G/DL (ref 3.5–5)
ALP SERPL-CCNC: 61 U/L (ref 46–116)
ALT SERPL W P-5'-P-CCNC: 27 U/L (ref 12–78)
ANION GAP SERPL CALCULATED.3IONS-SCNC: 3 MMOL/L (ref 4–13)
AST SERPL W P-5'-P-CCNC: 12 U/L (ref 5–45)
BILIRUB SERPL-MCNC: 0.57 MG/DL (ref 0.2–1)
BUN SERPL-MCNC: 15 MG/DL (ref 5–25)
CALCIUM SERPL-MCNC: 9 MG/DL (ref 8.3–10.1)
CHLORIDE SERPL-SCNC: 106 MMOL/L (ref 96–108)
CHOLEST SERPL-MCNC: 147 MG/DL
CO2 SERPL-SCNC: 27 MMOL/L (ref 21–32)
CREAT SERPL-MCNC: 0.62 MG/DL (ref 0.6–1.3)
GFR SERPL CREATININE-BSD FRML MDRD: 113 ML/MIN/1.73SQ M
GLUCOSE P FAST SERPL-MCNC: 84 MG/DL (ref 65–99)
HDLC SERPL-MCNC: 56 MG/DL
LDLC SERPL CALC-MCNC: 80 MG/DL (ref 0–100)
NONHDLC SERPL-MCNC: 91 MG/DL
POTASSIUM SERPL-SCNC: 4.2 MMOL/L (ref 3.5–5.3)
PROT SERPL-MCNC: 7.2 G/DL (ref 6.4–8.4)
SODIUM SERPL-SCNC: 136 MMOL/L (ref 135–147)
TRIGL SERPL-MCNC: 55 MG/DL
TSH SERPL DL<=0.05 MIU/L-ACNC: 2.58 UIU/ML (ref 0.45–4.5)

## 2023-06-16 ENCOUNTER — TELEPHONE (OUTPATIENT)
Dept: FAMILY MEDICINE CLINIC | Facility: CLINIC | Age: 38
End: 2023-06-16

## 2023-06-16 NOTE — TELEPHONE ENCOUNTER
Call placed to patient to reschedule the appointment on July 28th due to the provider being ooo  LMOM and sent a myc message

## 2023-11-13 DIAGNOSIS — Z86.19 H/O COLD SORES: ICD-10-CM

## 2023-11-13 RX ORDER — VALACYCLOVIR HYDROCHLORIDE 1 G/1
2000 TABLET, FILM COATED ORAL 2 TIMES DAILY PRN
Qty: 15 TABLET | Refills: 0 | Status: SHIPPED | OUTPATIENT
Start: 2023-11-13 | End: 2023-11-14

## 2024-06-13 ENCOUNTER — TELEPHONE (OUTPATIENT)
Dept: FAMILY MEDICINE CLINIC | Facility: CLINIC | Age: 39
End: 2024-06-13

## 2025-02-19 ENCOUNTER — RA CDI HCC (OUTPATIENT)
Dept: OTHER | Facility: HOSPITAL | Age: 40
End: 2025-02-19

## 2025-02-19 NOTE — PROGRESS NOTES
HCC coding opportunities       Chart reviewed, no opportunity found: CHART REVIEWED, NO OPPORTUNITY FOUND        Patients Insurance        Commercial Insurance: [employer]       This is a reminder to assess ((resolve/update/assess)  all HCC (risk adjustment) codes for the year 2025 as patients JOSE scores reset to zero with the New year.    Also, just a reminder to please review and assess all other chronic conditions for 2025.

## 2025-02-21 ENCOUNTER — OFFICE VISIT (OUTPATIENT)
Dept: FAMILY MEDICINE CLINIC | Facility: CLINIC | Age: 40
End: 2025-02-21
Payer: COMMERCIAL

## 2025-02-21 VITALS
HEIGHT: 65 IN | WEIGHT: 142 LBS | BODY MASS INDEX: 23.66 KG/M2 | SYSTOLIC BLOOD PRESSURE: 130 MMHG | TEMPERATURE: 98.7 F | DIASTOLIC BLOOD PRESSURE: 84 MMHG | OXYGEN SATURATION: 98 % | HEART RATE: 66 BPM

## 2025-02-21 DIAGNOSIS — Z00.00 ANNUAL PHYSICAL EXAM: Primary | ICD-10-CM

## 2025-02-21 DIAGNOSIS — Z86.19 H/O COLD SORES: ICD-10-CM

## 2025-02-21 DIAGNOSIS — E55.9 VITAMIN D DEFICIENCY: ICD-10-CM

## 2025-02-21 DIAGNOSIS — J06.9 UPPER RESPIRATORY TRACT INFECTION, UNSPECIFIED TYPE: ICD-10-CM

## 2025-02-21 DIAGNOSIS — R63.5 WEIGHT GAIN: ICD-10-CM

## 2025-02-21 DIAGNOSIS — Z12.31 ENCOUNTER FOR SCREENING MAMMOGRAM FOR BREAST CANCER: ICD-10-CM

## 2025-02-21 PROBLEM — F15.90 AMPHETAMINE USE: Status: RESOLVED | Noted: 2023-01-27 | Resolved: 2025-02-21

## 2025-02-21 PROBLEM — F90.0 ADHD (ATTENTION DEFICIT HYPERACTIVITY DISORDER), INATTENTIVE TYPE: Status: RESOLVED | Noted: 2023-01-27 | Resolved: 2025-02-21

## 2025-02-21 PROCEDURE — 99386 PREV VISIT NEW AGE 40-64: CPT | Performed by: INTERNAL MEDICINE

## 2025-02-21 RX ORDER — VALACYCLOVIR HYDROCHLORIDE 1 G/1
2000 TABLET, FILM COATED ORAL 2 TIMES DAILY PRN
Qty: 15 TABLET | Refills: 5 | Status: SHIPPED | OUTPATIENT
Start: 2025-02-21 | End: 2025-02-22

## 2025-02-21 RX ORDER — ERGOCALCIFEROL 1.25 MG/1
50000 CAPSULE, LIQUID FILLED ORAL WEEKLY
Qty: 12 CAPSULE | Refills: 3 | Status: SHIPPED | OUTPATIENT
Start: 2025-02-21

## 2025-02-21 RX ORDER — AZITHROMYCIN 250 MG/1
TABLET, FILM COATED ORAL
Qty: 6 TABLET | Refills: 0 | Status: SHIPPED | OUTPATIENT
Start: 2025-02-21 | End: 2025-02-26

## 2025-02-21 RX ORDER — NALTREXONE HYDROCHLORIDE 50 MG/1
50 TABLET, FILM COATED ORAL 2 TIMES DAILY
COMMUNITY
End: 2025-02-21 | Stop reason: ALTCHOICE

## 2025-02-21 RX ORDER — BUPROPION HYDROCHLORIDE 300 MG/1
300 TABLET ORAL DAILY
COMMUNITY
End: 2025-02-21 | Stop reason: SDUPTHER

## 2025-02-21 NOTE — ASSESSMENT & PLAN NOTE
Longstanding history of cold sores.  Successfully treats them with Valtrex.  Orders:  •  valACYclovir (VALTREX) 1,000 mg tablet; Take 2 tablets (2,000 mg total) by mouth 2 (two) times a day as needed (take 2 times a day as needed) for up to 1 day

## 2025-02-21 NOTE — ASSESSMENT & PLAN NOTE
She has been successfully losing weight on her current regimen of the Contrave and metformin.  I have given her a written prescription for Contrave to see if it would be more cost effective for her to get through her insurance, although it says nonformulary.  She may be helped by [facility].  Orders:  •  Naltrexone-buPROPion HCl ER 8-90 MG TB12; 1 pill 2 x day

## 2025-02-21 NOTE — ASSESSMENT & PLAN NOTE
Forgets to take her vitamin D frequently.  Have started vitamin D2 50,000 units once weekly.  Orders:  •  ergocalciferol (VITAMIN D2) 50,000 units; Take 1 capsule (50,000 Units total) by mouth once a week

## 2025-02-21 NOTE — ASSESSMENT & PLAN NOTE
Discussed healthy lifestyle.  Need for exercise.  Discussed supplements and vitamin replacements.  Recommended adequate sleep.  Continue with at least yearly well visits, and follow-up on the immunizations and screening tools that we advised today.  We did broach living will and durable medical power of  issues.

## 2025-02-21 NOTE — PROGRESS NOTES
Adult Annual Physical  Name: Leeanne Singh      : 1985      MRN: [id number]  Encounter Provider: Jose Campos DO  Encounter Date: 2025   Encounter department: [facility]    Assessment & Plan  Annual physical exam  Discussed healthy lifestyle.  Need for exercise.  Discussed supplements and vitamin replacements.  Recommended adequate sleep.  Continue with at least yearly well visits, and follow-up on the immunizations and screening tools that we advised today.  We did broach living will and durable medical power of  issues.       H/O cold sores  Longstanding history of cold sores.  Successfully treats them with Valtrex.  Orders:  •  valACYclovir (VALTREX) 1,000 mg tablet; Take 2 tablets (2,000 mg total) by mouth 2 (two) times a day as needed (take 2 times a day as needed) for up to 1 day    Vitamin D deficiency  Forgets to take her vitamin D frequently.  Have started vitamin D2 50,000 units once weekly.  Orders:  •  ergocalciferol (VITAMIN D2) 50,000 units; Take 1 capsule (50,000 Units total) by mouth once a week    Weight gain  She has been successfully losing weight on her current regimen of the Contrave and metformin.  I have given her a written prescription for Contrave to see if it would be more cost effective for her to get through her insurance, although it says nonformulary.  She may be helped by [facility].  Orders:  •  Naltrexone-buPROPion HCl ER 8-90 MG TB12; 1 pill 2 x day    Upper respiratory tract infection, unspecified type  Currently with upper respiratory infection, mostly viral.  No fevers and clear productive cough.  She would not take antibiotic unless things progress, I have given a printed prescription for azithromycin, she does not like to take medicine so she will not take this unless this progresses and becomes more fulminant.  She has serous otitis on the right.  Orders:  •  azithromycin (ZITHROMAX) 250 mg tablet; Take 2 the first day, then  take 1 daily    Encounter for screening mammogram for breast cancer  New Rx for mammogram printed for her today.  Orders:  •  Mammo screening bilateral w 3d and cad; Future    Immunizations and preventive care screenings were discussed with patient today. Appropriate education was printed on patient's after visit summary.    Counseling:  Dental Health: discussed importance of regular tooth brushing, flossing, and dental visits.  Exercise: the importance of regular exercise/physical activity was discussed. Recommend exercise 3-5 times per week for at least 30 minutes.       Depression Screening and Follow-up Plan: Patient was screened for depression during today's encounter. They screened negative with a PHQ-2 score of 0.          History of Present Illness     Adult Annual Physical:  Patient presents for annual physical. Patient is here to establish care.  Denies any new issues.  Denies any chest pain or shortness of breath.  No nausea vomiting diarrhea constipation.  No recent weight gain or weight loss.  Through an online program she is using Contrave/metformin for weight loss which she has been successful.  She exercises regularly.  She denies any fevers chills sweats  No significant family history to speak of other than hypertension.  Due for mammogram but lost the slip.  She does follow with a GYN..     Diet and Physical Activity:  - Diet/Nutrition: well balanced diet.  - Exercise: 3-4 times a week on average and moderate cardiovascular exercise.    Depression Screening:  - PHQ-2 Score: 0    General Health:  - Sleep: sleeps well and 7-8 hours of sleep on average.  - Hearing: normal hearing bilateral ears.  - Vision: wears contacts and most recent eye exam < 1 year ago.  - Dental: regular dental visits.    /GYN Health:  - Follows with GYN: no.   - Menopause: perimenopausal.   - History of STDs: no  - Contraception:. none, tubal ligation      Advanced Care Planning:  - Has an advanced directive?: no    - Has a  "durable medical POA?: no    - ACP document given to patient?: no      Review of Systems   Constitutional:  Negative for chills and fever.   HENT:  Positive for congestion and sinus pressure. Negative for ear pain and sore throat.    Eyes:  Negative for pain and visual disturbance.   Respiratory:  Negative for cough and shortness of breath.    Cardiovascular:  Negative for chest pain and palpitations.   Gastrointestinal:  Negative for abdominal pain and vomiting.   Genitourinary:  Negative for dysuria and hematuria.   Musculoskeletal:  Positive for arthralgias. Negative for back pain.   Skin:  Negative for color change and rash.   Neurological:  Negative for seizures and syncope.   Psychiatric/Behavioral:  The patient is nervous/anxious.    All other systems reviewed and are negative.        Objective   /84 (BP Location: Left arm, Patient Position: Sitting, Cuff Size: Standard)   Pulse 66   Temp 98.7 °F (37.1 °C) (Tympanic)   Ht 5' 5\" (1.651 m)   Wt 64.4 kg (142 lb)   SpO2 98%   BMI 23.63 kg/m²     Physical Exam  Vitals and nursing note reviewed.   Constitutional:       General: She is not in acute distress.     Appearance: Normal appearance. She is well-developed.   HENT:      Head: Normocephalic and atraumatic.      Ears:      Comments: Serous otitis on the right  Eyes:      Conjunctiva/sclera: Conjunctivae normal.   Cardiovascular:      Rate and Rhythm: Normal rate and regular rhythm.      Pulses: Normal pulses.      Heart sounds: Normal heart sounds. No murmur heard.  Pulmonary:      Effort: Pulmonary effort is normal. No respiratory distress.      Breath sounds: Normal breath sounds.   Abdominal:      Palpations: Abdomen is soft.      Tenderness: There is no abdominal tenderness.   Musculoskeletal:         General: No swelling.      Cervical back: Neck supple.   Skin:     General: Skin is warm and dry.      Capillary Refill: Capillary refill takes less than 2 seconds.   Neurological:      General: No " focal deficit present.      Mental Status: She is alert and oriented to person, place, and time.   Psychiatric:         Mood and Affect: Mood normal.

## 2025-07-30 DIAGNOSIS — E55.9 VITAMIN D DEFICIENCY: ICD-10-CM

## 2025-07-30 RX ORDER — ERGOCALCIFEROL 1.25 MG/1
50000 CAPSULE, LIQUID FILLED ORAL WEEKLY
Qty: 12 CAPSULE | Refills: 1 | Status: SHIPPED | OUTPATIENT
Start: 2025-07-30